# Patient Record
Sex: FEMALE
[De-identification: names, ages, dates, MRNs, and addresses within clinical notes are randomized per-mention and may not be internally consistent; named-entity substitution may affect disease eponyms.]

---

## 2020-03-28 ENCOUNTER — HOSPITAL ENCOUNTER (EMERGENCY)
Dept: HOSPITAL 25 - ED | Age: 35
Discharge: HOME | End: 2020-03-28
Payer: COMMERCIAL

## 2020-03-28 VITALS — SYSTOLIC BLOOD PRESSURE: 110 MMHG | DIASTOLIC BLOOD PRESSURE: 64 MMHG

## 2020-03-28 DIAGNOSIS — O44.12: ICD-10-CM

## 2020-03-28 DIAGNOSIS — M54.5: ICD-10-CM

## 2020-03-28 DIAGNOSIS — E03.9: ICD-10-CM

## 2020-03-28 DIAGNOSIS — Z3A.17: ICD-10-CM

## 2020-03-28 DIAGNOSIS — O23.42: Primary | ICD-10-CM

## 2020-03-28 DIAGNOSIS — Z79.899: ICD-10-CM

## 2020-03-28 LAB
ALBUMIN SERPL BCG-MCNC: 3.9 G/DL (ref 3.2–5.2)
ALBUMIN/GLOB SERPL: 1.3 {RATIO} (ref 1–3)
ALP SERPL-CCNC: 41 U/L (ref 34–104)
ALT SERPL W P-5'-P-CCNC: 46 U/L (ref 7–52)
ANION GAP SERPL CALC-SCNC: 8 MMOL/L (ref 2–11)
AST SERPL-CCNC: 28 U/L (ref 13–39)
BASOPHILS # BLD AUTO: 0 10^3/UL (ref 0–0.2)
BUN SERPL-MCNC: 7 MG/DL (ref 6–24)
BUN/CREAT SERPL: 9.9 (ref 8–20)
CALCIUM SERPL-MCNC: 9.7 MG/DL (ref 8.6–10.3)
CHLORIDE SERPL-SCNC: 104 MMOL/L (ref 101–111)
EOSINOPHIL # BLD AUTO: 0.1 10^3/UL (ref 0–0.6)
GLOBULIN SER CALC-MCNC: 3 G/DL (ref 2–4)
GLUCOSE SERPL-MCNC: 94 MG/DL (ref 70–100)
HCO3 SERPL-SCNC: 22 MMOL/L (ref 22–32)
HCT VFR BLD AUTO: 34 % (ref 35–47)
HGB BLD-MCNC: 11.8 G/DL (ref 12–16)
LYMPHOCYTES # BLD AUTO: 1.9 10^3/UL (ref 1–4.8)
MCH RBC QN AUTO: 32 PG (ref 27–31)
MCHC RBC AUTO-ENTMCNC: 35 G/DL (ref 31–36)
MCV RBC AUTO: 91 FL (ref 80–97)
MONOCYTES # BLD AUTO: 0.5 10^3/UL (ref 0–0.8)
NEUTROPHILS # BLD AUTO: 6.3 10^3/UL (ref 1.5–7.7)
NRBC # BLD AUTO: 0 10^3/UL
NRBC BLD QL AUTO: 0
PLATELET # BLD AUTO: 265 10^3/UL (ref 150–450)
POTASSIUM SERPL-SCNC: 3.8 MMOL/L (ref 3.5–5)
PROT SERPL-MCNC: 6.9 G/DL (ref 6.4–8.9)
RBC # BLD AUTO: 3.72 10^6 /UL (ref 3.7–4.87)
RBC UR QL AUTO: (no result)
SODIUM SERPL-SCNC: 134 MMOL/L (ref 135–145)
WBC # BLD AUTO: 8.8 10^3/UL (ref 3.5–10.8)
WBC UR QL AUTO: (no result)

## 2020-03-28 PROCEDURE — 87086 URINE CULTURE/COLONY COUNT: CPT

## 2020-03-28 PROCEDURE — 80053 COMPREHEN METABOLIC PANEL: CPT

## 2020-03-28 PROCEDURE — 81003 URINALYSIS AUTO W/O SCOPE: CPT

## 2020-03-28 PROCEDURE — 85025 COMPLETE CBC W/AUTO DIFF WBC: CPT

## 2020-03-28 PROCEDURE — 99282 EMERGENCY DEPT VISIT SF MDM: CPT

## 2020-03-28 PROCEDURE — 81015 MICROSCOPIC EXAM OF URINE: CPT

## 2020-03-28 PROCEDURE — 36415 COLL VENOUS BLD VENIPUNCTURE: CPT

## 2020-03-28 PROCEDURE — 76815 OB US LIMITED FETUS(S): CPT

## 2020-03-28 NOTE — XMS REPORT
Continuity of Care Document (CCD)

 Created on:2020



Patient:Leatha Brumfield

Sex:Female

:1985

External Reference #:[id number]





Demographics







 Address  [address]



   [place] [address]

 

 Home Phone  [phone]

 

 Preferred Language  en

 

 Marital Status  Not  or 

 

 Temple Affiliation  Unknown

 

 Race  Unknown

 

 Additional Race(s)  Other Race

 

 Ethnic Group  Not  or 









Author







 Name  Jillian Mabry CNM

 

 Address  [address] [place] [address]









Problems







 Active Problems  Provider  Date

 

 Recurrent pregnancy loss  Yoandy Lance M.D.  Onset: 01/15/2020







Social History







 Type  Date  Description  Comments

 

 Birth Sex    Unknown  

 

 Cigarette Use    Does Not Smoke Cigarettes  

 

 ETOH Use    Denies alcohol use  

 

 Recreational Drug Use    Denies Drug Use  

 

 Tobacco Use  Start: Unknown  Patient has never smoked  

 

 Smoking Status  Reviewed: 20  Patient has never smoked  

 

 Exercise Type/Frequency    Exercises regularly  

 

 Seat Belt/Car Seat    Always uses seat belt  







Allergies, Adverse Reactions, Alerts







 Description

 

 No Known Drug Allergies







Medications







 Active Medications  SIG  Qnty  Indications  Ordering Provider  Date

 

 Levothyroxine Sodium  1 by mouth  90tabs    Yoandy Lance,  2020



   every day      M.D.  



 88mcg Tablets          



           

 

 Aspirin 81 Low Dose  1 by mouth  90units    Yoandy Lance,  01/15/2020



                  81mg  every day      M.D.  



 Chewtabs          



           

 

 Vitamin D  1 by mouth      Unknown  



        25mcg (1000 Ut)  every day        



 Tablets          



           

 

 Prenatal Multivitamin  1 tablet by      Unknown  



 Plus Dha  mouth daily        



       27-0.8-250mg          



 Capsules          



           









 History Medications









 Progesterone Micronized  1 by mouth every  14caps    Yoandy Lance,  01/15/
2020 -



   day      M.D.  2020



 200mg Capsules          



           







Medications Administered in Office







 Medication  SIG  Qnty  Indications  Ordering Provider  Date

 

 PT SCRN Tbco Id as Non User        Jillian Mabry CNM  2020



                   Injection          



           

 

 PT SCRN Tbco Id as Non User        Letty Osborn CNM  2020



                   Injection          



           

 

 PT SCRN Tbco Id as Non User        Yoandy Lance M.D.  01/15/2020



                   Injection          



           







Immunizations







 Description

 

 No Information Available







Vital Signs







 Date  Vital  Result  Comment

 

 2020  2:11pm  BP Systolic  114 mmHg  









 BP Diastolic  76 mmHg  

 

 Height  64 inches  5'4"

 

 Weight  202.00 lb  

 

 BMI (Body Mass Index)  34.7 kg/m2  

 

 Last Menstrual Period  4385283  

 

   4  

 

 Parity  1  









 2020  9:22am  BP Systolic  112 mmHg  









 BP Diastolic  68 mmHg  

 

 Height  64 inches  5'4"

 

 Weight  205.00 lb  

 

 BMI (Body Mass Index)  35.2 kg/m2  

 

 Last Menstrual Period  0702408  

 

   4  

 

 Parity  1  







Results







 Test  Acquired Date  Facility  Test  Result  H/L  Range  Note

 

 Laboratory test  01/15/2020  [facility]  HCG Pregnancy  18719.00   
   1



 [place] [address]    mIU/mL      



     [phone]          

 

 Type And Screen  01/15/2020  [facility]  Patient Blood  O Positive 
     



     [place] [address]  Type        



     [phone]          









 Antibody Screen  NEGATIVE      









 Laboratory test  01/15/2020  [facility]  Progesterone  20.7 ng/mL  
    2



 finding    [place], [place] [address] [phone]          









 T4 Free  0.95 ng/dL  Normal  0.61-1.12  3

 

 TSH  6.79 mcIU/mL  High  0.34-5.60  4









 1  <5.0 Negative



   



   5.0 - 25.0  Indeterminate (Repeat testing recommended after



   72 hours)



   >25.0  Positive



   



   Perimenopausal women can display HCG levels of up to 20



   mIU/mL

 

 2  Female reference ranges for Progesterone:



   Follicular phase.......0.3 - 1.5 ng/ml



   Mid-luteal phase.......5.2 - 18.5 ng/ml



   Postmenopausal.........< 0.8 ng/ml



   Pregnant



   1st trimester.........4.7 - 50.0 ng/ml



   2nd trimester.........19.4 - 45.3 ng/ml

 

 3  [id number]

 

 4  [id number]







Procedures







 Date  Code  Description  Status

 

 2020  56726  OB Ultrasound First Trimester  Completed

 

 2020  62113  OB Ultrasound First Trimester  Completed

 

 01/15/2020  94027  Echography Pregnant Uterus Limited  Completed







Medical Devices







 Description

 

 No Information Available







Encounters







 Type  Date  Location  Provider  Dx  Diagnosis

 

 Office Visit  2020  [facility]  Jillian Mabry, MANI  O26.91  Pregnancy 
related



   2:30p        conditions,



           unspecified, first



           trimester









 N83.209  Unspecified ovarian cyst, unspecified side









 Office Visit  2020  9:30a  [facility]  Letty Osborn,  O26.91  Pregnancy 
related



       [facility]    conditions,



           unspecified, first



           trimester









 N83.291  Other ovarian cyst, right side

 

 E03.9  Hypothyroidism, unspecified

 

 E66.9  Obesity, unspecified









 Office Visit  01/15/2020  9:20a  [facility]  Yoandy Lance,  N96  
Recurrent pregnancy



       M.D.    loss









 N91.1  Secondary amenorrhea

 

 O36.80x1  Pregnancy with inconclusive fetal viability, fetus 1







Assessments







 Date  Code  Description  Provider

 

 2020  O26.91  Pregnancy related conditions, unspecified,  Jillian Mabry CNM



     first trimester  

 

 2020  O26.91  Pregnancy related conditions, unspecified,  Ultrasounds



     first trimester  

 

 2020  N83.209  Unspecified ovarian cyst, unspecified side  Jillian Mabry CNM

 

 2020  N83.209  Unspecified ovarian cyst, unspecified side  Ultrasounds

 

 2020  O26.91  Pregnancy related conditions, unspecified,  Noelle Kline MD



     first trimester  

 

 2020  O26.91  Pregnancy related conditions, unspecified,  Letty Osborn CNM



     first trimester  

 

 2020  O26.91  Pregnancy related conditions, unspecified,  Ultrasounds



     first trimester  

 

 2020  N83.291  Other ovarian cyst, right side  Letty Osborn CNM

 

 2020  E03.9  Hypothyroidism, unspecified  Ltety Osborn CNM

 

 2020  E66.9  Obesity, unspecified  Letty Osborn CNM

 

 01/15/2020  N96  Recurrent pregnancy loss  Yoandy Lance M.D.

 

 01/15/2020  N91.1  Secondary amenorrhea  Yoandy Lance M.D.

 

 01/15/2020  O36.80x1  Pregnancy with inconclusive fetal  Yoandy Lance M.D.



     viability, fetus 1  







Plan of Treatment

Future Appointment(s):2020  9:30 am - Gray Haque CNM at [facility]  9:00 am - Ultrasounds at [facility]2020 - CARRIE QuickMO26.91 Pregnancy related conditions, unspecified, first trimesterComments:We 
reviewed the importance of self care by a healthy, well balanced diet, regular 
sleep and mild to moderate exercise.You are taking a daily prenatal vitamin and 
I encourage you to continue doing soThyroid control in pregnancy is very 
important. Continue your updated dose of thyroid medication and we will re-
check your labs at your next visit in 2 weeksGreat Expectations Booklet 
provided for you to review. Will re-visit at new ob intake in 2 dyypsA04.291 
Other ovarian cyst, right sideComments:We discussed the warning signs for 
surgical abdomen including worsening pain/when to seek urgent evaluation 
through our office or the ER. We will re-check the cyst in 2 weeks by 
bwybcncbigQ82.9 Hypothyroidism, joglqfpklknO40.9 Obesity, unspecified



Functional Status







 Description

 

 No Information Available







Mental Status







 Description

 

 No Information Available







Referrals







 Description

 

 No Information Available

## 2020-03-28 NOTE — ED
GI/ HPI





- HPI Summary


HPI Summary: 


34 year old F presenting to [facility] with a chief complaint of vaginal bleeding 

since this morning. Patient reports lower back pain. The patient rates the pain 

1/10 in severity. Symptoms aggravated by nothing. Symptoms alleviated by 

nothing. The patient is 17 weeks pregnant and reports that this is her second 

pregnancy. She has had spotting on 1 pad. She states that she has had light 

spotting previously. She takes Baby Aspirin at night but denies any history of 

a clotting disorder. Medication list reviewed. Allergy list reviewed.





- History of Current Complaint


Chief Complaint: EDOBProblems


Time Seen by Provider: 03/28/20 11:18


Stated Complaint: 17 WKS PREG/BLEEDING PER PT


Hx Obtained From: Patient


Onset/Duration: Started Hours Ago


Current Severity: Mild


Pain Intensity: 1


Location of Pain: Other - Lower back


Associated Signs and Symptoms: Positive: Back Pain


Aggravating Factor(s): Nothing


Alleviating Factor(s): Nothing





- Allergy/Home Medications


Allergies/Adverse Reactions: 


 Allergies











Allergy/AdvReac Type Severity Reaction Status Date / Time


 


No Known Allergies Allergy   Verified 03/28/20 11:25











Home Medications: 


 Home Medications





Cephalexin CAP* [Keflex CAP*] 500 mg PO BID 5 Days #10 cap 03/28/20 [Rx]











PMH/Surg Hx/FS Hx/Imm Hx


Endocrine/Hematology History: Reports: Hx Thyroid Disease


   Denies: Hx Blood Disorders


EENT History: 


   Denies: Hx Deafness





- Surgical History


Surgical History: Yes


Surgery Procedure, Year, and Place: Ear surgery


Infectious Disease History: No


Infectious Disease History: 


   Denies: Traveled Outside the [place] in Last 30 Days





- Family History


Known Family History: 


   Negative: Hypertension





- Social History


Alcohol Use: None


Hx Substance Use: No


Substance Use Type: Reports: None


Hx Tobacco Use: No


Smoking Status (MU): Never Smoked Tobacco





Review of Systems


Positive: other - Vaginal bleeding


Positive: Other - Lower back pain


All Other Systems Reviewed And Are Negative: Yes





Physical Exam





- Summary


Physical Exam Summary: 


Constitutional: Well-developed, Well-nourished, Alert. (-) Distressed


Skin: Warm, Dry


HENT: Normocephalic; Atraumatic


Eyes: Conjunctiva normal


Neck: Musculoskeletal ROM normal neck. (-) JVD, (-) Stridor, (-) Nuchal rigidity


Cardio: Rhythm regular, rate normal, Heart sounds normal; Intact distal pulses; 

Radial pulses are 2+ and symmetric. (-) Murmur


Pulmonary/Chest wall: Effort normal. (-) Respiratory distress, (-) Wheezes, (-) 

Rales


Abd: Soft, (-) tenderness, (-) Distension, (-) Guarding, (-) Rebound, bedside 

ultrasound with positive FCA


Musculoskeletal: (-) Edema


Lymph: (-) Cervical adenopathy


Neuro: Alert, Oriented x3


Psych: Mood and affect Normal


Triage Information Reviewed: Yes


Vital Signs On Initial Exam: 


 Initial Vitals











Temp Pulse Resp BP Pulse Ox


 


 98.4 F   111   18   107/76   99 


 


 03/28/20 11:24  03/28/20 11:24  03/28/20 11:24  03/28/20 11:24  03/28/20 11:24











Vital Signs Reviewed: Yes





Procedures





- Sedation


Patient Received Moderate/Deep Sedation with Procedure: No





Diagnostics





- Vital Signs


 Vital Signs











  Temp Pulse Resp BP Pulse Ox


 


 03/28/20 11:24  98.4 F  111  18  107/76  99














- Laboratory


Result Diagrams: 


 03/28/20 11:43





 03/28/20 11:43


Lab Statement: Any lab studies that have been ordered have been reviewed, and 

results considered in the medical decision making process.





- Ultrasound


  ** Fetal Ultrasound


Ultrasound Interpretation Completed By: Radiologist


Summary of Ultrasound Findings: Single intrauterine gestation with a 

gestational age of 17 weeks 2 days.  determined by today's initial ultrasound. 

Fetal heart activity is noted. There is anterior placenta with complete 

placenta previa covering the cervical os.  ED physician has reviewed this 

report.





GIGU Course/Dx





- Course


Course Of Treatment: 35 y/o F at 17 weeks pregnant p/w vaginal spotting.  - 

denies trauma/intercourse. Some mild back pain. Labs w bacteria in UA, will 

treat w keflex. US w placenta previa. D/w OB, patient given precautions. Blood 

type O positive. Follow up w OB Monday.





- Diagnoses


Provider Diagnoses: 


 Placenta previa, Vaginal bleeding, UTI (urinary tract infection)








- Physician Notifications


Discussed Care Of Patient With: Jasmine Graves


Time Discussed With Above Provider: 12:45


Instructed by Provider To: Other - Discussed with Dr. Graves who recommends the 

patient follow-up with her OB on Monday and to advise the patient to avoid 

intercourse or strenuous exercise.





Discharge ED





- Sign-Out/Discharge


Documenting (check all that apply): Patient Departure





- Discharge Plan


Condition: Stable


Disposition: HOME


Prescriptions: 


Cephalexin CAP* [Keflex CAP*] 500 mg PO BID 5 Days #10 cap


Patient Education Materials:  Placenta Previa (ED), Urinary Tract Infection in 

Pregnancy (ED)


Referrals: 


No Primary Care Phys,NOPCP [Primary Care Provider] - 


Additional Instructions: 


You were seen in the ER for vaginal bleeding. We spoke with our OB who 

recommends that you avoid strenuous exercise, sexual intercourse, heavy 

lifting. Follow up with your OB on Monday and let them know you were seen here 

for vaginal bleeding. Return for worsening bleeding, pain, loss of fluid, 

contractions. It was a pleasure taking care of you today. 





- Billing Disposition and Condition


Condition: STABLE


Disposition: Home





- Attestation Statements


Document Initiated by Kehindeibemili: Yes


Documenting Scribe: Arabella Hooper


Provider For Whom Ashwinie is Documenting (Include Credential): Elina Mayer MD


Scribe Attestation: 


I, Arabella Hooper, scribed for Elina Mayer MD on 03/28/20 at 1306. 


Scribe Documentation Reviewed: Yes


Provider Attestation: 


The documentation as recorded by the Arabella apul accurately 

reflects the service I personally performed and the decisions made by me, 

Elina Mayer MD


Status of Scribe Document: Viewed